# Patient Record
(demographics unavailable — no encounter records)

---

## 2024-10-16 NOTE — DISCUSSION/SUMMARY
[] : The components of the vaccine(s) to be administered today are listed in the plan of care. The disease(s) for which the vaccine(s) are intended to prevent and the risks have been discussed with the caretaker.  The risks are also included in the appropriate vaccination information statements which have been provided to the patient's caregiver.  The caregiver has given consent to vaccinate. [FreeTextEntry1] : WEIGHT PERCENTILES DISCUSSED WAS 50% NOW 27% (LAURA is not crawling yet) h/u 1 month for flu #2 and get weighed that day if suboptimal wt gain, would recommend seeing our nutritionist  Continue breastmilk or formula as desired. ADVANCE DIET AS TOLERATED. Increase table foods, 3 meals with 2-3 snacks per day. Incorporate up to 6 oz of flourinated water daily in a sippy cup. Discussed weaning of bottle and pacifier. Wipe teeth daily with washcloth. When in car, patient should be in rear-facing car seat in back seat. Put baby to sleep in own crib with no loose or soft bedding. Lower crib matress. Help baby to maintain consistent daily routines and sleep schedule. Recognize stranger anxiety. Ensure home is safe since baby is increasingly mobile. Be within arm's reach of baby at all times. Use consistent, positive discipline. Avoid screen time. Read aloud to baby. f/u for 12 month [facility]

## 2024-10-16 NOTE — PHYSICAL EXAM

## 2024-10-16 NOTE — HISTORY OF PRESENT ILLNESS
[FreeTextEntry7] : 9month old m here for a physical [FreeTextEntry1] : FEEDING: Tolerating feeds better Getting speech therapy 1x per week for h/o "choking" Now eating solids QD Continues to take Famotidine 0.4 ml BID per mom GI said likely wean at 1-1.5 yr of age Reflux also seems to exacerbate rhinitis EBM 20-24 oz per day SLEEP:  No issues.12 hours ELIMINATION: Frequent urination. Stools daily, soft. SAFETY: Rear facing car seat No exposure to cigarette smoking.  Having eye laser surgery - prob december   CONCERNS: constant screeching/ makes noise no consonants yet (note baby is 6 months corrected)

## 2024-11-20 NOTE — HISTORY OF PRESENT ILLNESS
[de-identified] : weight check  [FreeTextEntry6] : doing well hungrier po bid now wt % slightly improved saw pulm this am here for 2nd flu

## 2024-11-20 NOTE — REASON FOR VISIT
[Routine Follow-Up] : a routine follow-up visit for [BPD] : BPD [Medical Records] : medical records [Mother] : mother

## 2024-11-20 NOTE — PHYSICAL EXAM
[Well Nourished] : well nourished [Well Developed] : well developed [Well Groomed] : well groomed [Alert] : ~L alert [Active] : active [Normal Breathing Pattern] : normal breathing pattern [No Respiratory Distress] : no respiratory distress [No Drainage] : no drainage [No Conjunctivitis] : no conjunctivitis [No Nasal Drainage] : no nasal drainage [No Stridor] : no stridor [Absence Of Retractions] : absence of retractions [Symmetric] : symmetric [Good Expansion] : good expansion [No Acc Muscle Use] : no accessory muscle use [Equal Breath Sounds] : equal breath sounds bilaterally [Good aeration to bases] : good aeration to bases [No Crackles] : no crackles [No Rhonchi] : no rhonchi [No Wheezing] : no wheezing [Normal Sinus Rhythm] : normal sinus rhythm [No Heart Murmur] : no heart murmur [No Clubbing] : no clubbing [Capillary Refill < 2 secs] : capillary refill less than two seconds [No Cyanosis] : no cyanosis [No Contractures] : no contractures [Alert and  Oriented] : alert and oriented [FreeTextEntry3] : ext normal [FreeTextEntry5] : MMM [FreeTextEntry7] : no tachypnea, no retractions, no crackles [FreeTextEntry9] : +umbilical hernia [de-identified] : good tone [de-identified] : no visible rashes on exposed skin

## 2024-11-20 NOTE — REVIEW OF SYSTEMS
[Spitting Up] : spitting up [Immunizations are up to date] : Immunizations are up to date [RSV prophylaxis received] : RSV prophylaxis received [Poor Appetite] : no poor appetite [Snoring] : no snoring [Apnea] : no apnea [Heart Disease] : no heart disease [Wheezing] : no wheezing [Cough] : no cough [Bronchitis] : no bronchitis [Pneumonia] : no pneumonia [Problems Swallowing] : no problems swallowing [Reflux] : reflux [Vomiting] : no vomiting [Eczema] : no ezcema

## 2024-11-20 NOTE — SOCIAL HISTORY
[Mother] : mother [Father] : father [Cat] : cat [Smokers in Household] : there are no smokers in the home

## 2024-11-20 NOTE — CONSULT LETTER
[Dear  ___] : Dear  [unfilled], [Consult Letter:] : I had the pleasure of evaluating your patient, [unfilled]. [Please see my note below.] : Please see my note below. [Consult Closing:] : Thank you very much for allowing me to participate in the care of this patient.  If you have any questions, please do not hesitate to contact me. [Sincerely,] : Sincerely, [FreeTextEntry2] : Fany Dawn MD [FreeTextEntry3] : Carol Barfield MD Director, [facility]- Pediatric Pulmonology [facility] or [place] , Department of Pediatrics, [facility]

## 2024-11-20 NOTE — BIRTH HISTORY
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 CPAP, RA since 3/28

## 2024-11-20 NOTE — HISTORY OF PRESENT ILLNESS
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    Graduated from special bottles.    Off diuretics since summer.  Off oxygen since March.  No respiratory sx, no viral illnesses/colds.  Wears owlet at night for parent comfort, no desats, plans to stop after winter.     Slow weight gain.  Still with reflux.  Follows with GI.  On Pepcid.  Sometimes coughs when refluxing but not when swallowing or feeding.  Starting solids.    Possibly needs ROP surgery but may be outgrowing enough to not need, has f/u end of year.  Mom nervous about anesthesia/ETT.  [place] [facility]     Developing well, getting therapies, very happy with progress.   6/24 4mo f/u CLD.  Previously seen by NP Kaylynn, wants to transfer to [facility].    No respiratory sx, no snoring/apneas/cyanosis.  Wears owlet at night for parent comfort, 100%.    Remains on diuril 0.44ml once a day (3.7mg/kg/day), not being weight adjusted.    Follows with [facility]&S. 5/16 MBSS moderate oropharyngeal dysphagia, reduced endurance and coordination, inconsistent silent penetration with retrieval improved with special bottle.   No coughing or choking with feeds.    CURLY well controlled on famotidine.   +fhx allergies.  Dad with no true asthma mother clarifies today, he was given pump to try x 1.  4/15/24  Jer is a 3 month old ex 26 Weeker with BPD who presents for initial pulmonary evaluation. 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 D/c on chlorothiazide 250 mg/5 mL oral suspension--0.44 milliliter(s) by mouth once a day. Doing well since d/c, no respiratory concerns. No coughing or wheezing. No noisy breathing. On room air. Some spitting up following rotavirus vaccine. Feeding well, gaining well. Frequent bowel movements and wet diapers. Received the Beyfortus vaccine in hospital before discharge.   Modified Asthma Predictive Index: Major risk factors: 1. +parental hx of asthma- father with sports related asthma  2. No known allergic rhinitis or food allergies 3. No eczema

## 2024-11-20 NOTE — DISCUSSION/SUMMARY
[FreeTextEntry1] : f/u for [facility]cc  [] : The components of the vaccine(s) to be administered today are listed in the plan of care. The disease(s) for which the vaccine(s) are intended to prevent and the risks have been discussed with the caretaker.  The risks are also included in the appropriate vaccination information statements which have been provided to the patient's caregiver.  The caregiver has given consent to vaccinate.

## 2024-12-19 NOTE — REVIEW OF SYSTEMS
[Rhinorrhea] : rhinorrhea [Nasal Congestion] : nasal congestion [Cough] : cough [Negative] : Psychological  [Immunizations are up to date] : Immunizations are up to date [Synagis Injection] : synagis injection

## 2024-12-21 NOTE — PHYSICAL EXAM
[Roll Prone to Supine] : roll prone to supine [Roll Supine to Prone] : rolls supine to prone [Creep] : creeps [Come to Sit] : comes to sit [Unfisted] : unfisted [Transfer] : transfers objects [Unilateral Reach/Grasp] : unilaterally reaches/grasps  [Alert To Sounds] : alert to sounds [Soothes When Picked Up] : soothes when picked up  [Social Smile] : has a social smile [Orients To Voice] : orients to voice ["Yulia Ochoa"] : yulia yee [Razzing] : razzing [Babbling] : babbling [Righting Reflex] : normal righting reflex [Downward [place]] : normal downward parachute [Lateral Protective] : normal lateral protective [Normal] : sensation is intact to light touch [Laughs Aloud] : does not laugh aloud

## 2024-12-21 NOTE — BIRTH HISTORY
[At ___ Weeks Gestation] : at [unfilled] weeks gestation [de-identified] : Jer is an ex-26+6 weeker born to a , 30-year-old mother. Prenatal history remarkable for IGUR 1% with AEDV . received 2x courses of betamethasone. CPAP +5, 30% started at 1 minute of life and PPV given for 3-miomnutes with improvement.  Apgar 4/8 at 1 and 5 minutes, respectively. Transferred

## 2024-12-21 NOTE — PHYSICAL EXAM
[Roll Prone to Supine] : roll prone to supine [Roll Supine to Prone] : rolls supine to prone [Creep] : creeps [Come to Sit] : comes to sit [Unfisted] : unfisted [Transfer] : transfers objects [Unilateral Reach/Grasp] : unilaterally reaches/grasps  [Alert To Sounds] : alert to sounds [Soothes When Picked Up] : soothes when picked up  [Social Smile] : has a social smile [Orients To Voice] : orients to voice ["Yulia Ochoa"] : yulia yee [Razzing] : razzing [Babbling] : babbling [Righting Reflex] : normal righting reflex [Downward [place]] : normal downward parachute [Lateral Protective] : normal lateral protective [Normal] : sensation is intact to light touch [Laughs Aloud] : does not laugh aloud

## 2024-12-21 NOTE — HISTORY OF PRESENT ILLNESS
[Gestational Age: ___] : Gestational Age in Weeks: [unfilled] [Chronological Age: ___] : Chronological Age in Months: [unfilled] [Corrected Age: ___] : Corrected Age: [unfilled] [Gastroenterology: ___] : Gastroenterology: [unfilled] [Ophthalmology: ___] : Ophthalmology: [unfilled] [Pulmonology: ___] : Pulmonology: [unfilled] [None] : None [No Feeding Issues] : no feeding issues. [Early Intervention] : Early Intervention services [de-identified] : 11-month-old born at [facility] via  due to maternal history of chronic hypertension. Jer is an ex-26+6 weeker born to a , 30-year-old mother. Prenatal history remarkable for IGUR 1% with AEDV . received 2x courses of betamethasone. CPAP +5, 30% started at 1 minute of life and PPV given for 3-miomnutes with improvement.  Apgar 4/8 at 1 and 5 minutes, respectively. Transferred to NICU. Was in the NICU for almost 3-months. He is doing great.  Language: he is babbling but not saying mama/carol yet. A lot of screeching and laughing out loud.  Gross Motor: he can roll back and forth, not crawling but he gets on all four and creeping back. He can go from a lying position to sitting.  He is pulling to stand.   Fine Motor: reaches out to things, transfers from one hand to another, he likes to bang things together. Feeding/elimination: 20-24 oz of breast milk per day. He shows interest in what mom is eating; he will put it in his mouth and playing with food. He will eat pancakes. He may gag sometimes but he doesn't feel discouraged by it. No choking. Normal BM, no constipation.  Sleep: he is a very good sleeper. He sleeps 12-14 hours per night. He will nap also through the day. They were concerned about his weight because he dropped percentiles but he is doing well.   [de-identified] : none [de-identified] : none [de-identified] : yes, florencio famotidine 0.4 mg x 2 per day [de-identified] : none [de-identified] : none [de-identified] : none [de-identified] : none [de-identified] : recent with URI [de-identified] : none [de-identified] : none [de-identified] : none [de-identified] : he gets speech/feeding therapy 2x per week and PT: 2x per week, they just added a : to teach him how to play.  He has more interest in people than in toys.

## 2024-12-21 NOTE — BIRTH HISTORY
[At ___ Weeks Gestation] : at [unfilled] weeks gestation [de-identified] : Jer is an ex-26+6 weeker born to a , 30-year-old mother. Prenatal history remarkable for IGUR 1% with AEDV . received 2x courses of betamethasone. CPAP +5, 30% started at 1 minute of life and PPV given for 3-miomnutes with improvement.  Apgar 4/8 at 1 and 5 minutes, respectively. Transferred

## 2024-12-21 NOTE — HISTORY OF PRESENT ILLNESS
[Gestational Age: ___] : Gestational Age in Weeks: [unfilled] [Chronological Age: ___] : Chronological Age in Months: [unfilled] [Corrected Age: ___] : Corrected Age: [unfilled] [Gastroenterology: ___] : Gastroenterology: [unfilled] [Ophthalmology: ___] : Ophthalmology: [unfilled] [Pulmonology: ___] : Pulmonology: [unfilled] [None] : None [No Feeding Issues] : no feeding issues. [Early Intervention] : Early Intervention services [de-identified] : 11-month-old born at [facility] via  due to maternal history of chronic hypertension. Jer is an ex-26+6 weeker born to a , 30-year-old mother. Prenatal history remarkable for IGUR 1% with AEDV . received 2x courses of betamethasone. CPAP +5, 30% started at 1 minute of life and PPV given for 3-miomnutes with improvement.  Apgar 4/8 at 1 and 5 minutes, respectively. Transferred to NICU. Was in the NICU for almost 3-months. He is doing great.  Language: he is babbling but not saying mama/carol yet. A lot of screeching and laughing out loud.  Gross Motor: he can roll back and forth, not crawling but he gets on all four and creeping back. He can go from a lying position to sitting.  He is pulling to stand.   Fine Motor: reaches out to things, transfers from one hand to another, he likes to bang things together. Feeding/elimination: 20-24 oz of breast milk per day. He shows interest in what mom is eating; he will put it in his mouth and playing with food. He will eat pancakes. He may gag sometimes but he doesn't feel discouraged by it. No choking. Normal BM, no constipation.  Sleep: he is a very good sleeper. He sleeps 12-14 hours per night. He will nap also through the day. They were concerned about his weight because he dropped percentiles but he is doing well.   [de-identified] : none [de-identified] : none [de-identified] : yes, florencio famotidine 0.4 mg x 2 per day [de-identified] : none [de-identified] : none [de-identified] : none [de-identified] : none [de-identified] : recent with URI [de-identified] : none [de-identified] : none [de-identified] : none [de-identified] : he gets speech/feeding therapy 2x per week and PT: 2x per week, they just added a : to teach him how to play.  He has more interest in people than in toys.

## 2025-05-19 NOTE — HISTORY OF PRESENT ILLNESS
[de-identified] : weight check and vax [FreeTextEntry6] : Mom notes since last visit had COVID, coxsackie and stomach virus Eating well Eating all food groups Adding fats to his foods Feeling well today

## 2025-07-15 NOTE — DISCUSSION/SUMMARY
[Family Support] : family support [Child Development and Behavior] : child development and behavior [Language Promotion/Hearing] : language promotion/hearing [Toliet Training Readiness] : toliet training readiness [Safety] : safety [Mother] : mother [Father] : father [FreeTextEntry1] : - Discussed natural history and manifestations of coxsackie.  Discussed supportive care and maintaining adequate hydration.   - Follow up when well for inj, due for dtap

## 2025-07-15 NOTE — HISTORY OF PRESENT ILLNESS
[Parents] : parents [Cow's milk (Ounces per day ___)] : consumes [unfilled] oz of Cow's milk per day [Normal] : Normal [Sippy cup use] : Sippy cup use [Brushing teeth] : Brushing teeth [Vitamin] : Primary Fluoride Source: Vitamin [Playtime] : Playtime  [No] : No cigarette smoke exposure [FreeTextEntry7] : 18 M [facility].  Patient sick since 7/12, rash, decreased PO, coxsackie going around at .  Follows with ophthalmology and pulm.    [de-identified] : Good appetite, eats a variety of foods.

## 2025-07-15 NOTE — PHYSICAL EXAM
[Alert] : alert [No Acute Distress] : no acute distress [Normocephalic] : normocephalic [Anterior [place] Closed] : anterior fontanelle closed [Red Reflex Bilateral] : red reflex bilateral [PERRL] : PERRL [Normally Placed Ears] : normally placed ears [Auricles Well Formed] : auricles well formed [Clear Tympanic membranes with present light reflex and bony landmarks] : clear tympanic membranes with present light reflex and bony landmarks [No Discharge] : no discharge [Nares Patent] : nares patent [Palate Intact] : palate intact [Uvula Midline] : uvula midline [Tooth Eruption] : tooth eruption  [Supple, full passive range of motion] : supple, full passive range of motion [No Palpable Masses] : no palpable masses [Symmetric Chest Rise] : symmetric chest rise [Clear to Auscultation Bilaterally] : clear to auscultation bilaterally [Regular Rate and Rhythm] : regular rate and rhythm [S1, S2 present] : S1, S2 present [No Murmurs] : no murmurs [+2 Femoral Pulses] : +2 femoral pulses [Soft] : soft [NonTender] : non tender [Non Distended] : non distended [Normoactive Bowel Sounds] : normoactive bowel sounds [No Hepatomegaly] : no hepatomegaly [No Splenomegaly] : no splenomegaly [No Abnormal Lymph Nodes Palpated] : no abnormal lymph nodes palpated [No Clavicular Crepitus] : no clavicular crepitus [Symmetric Buttocks Creases] : symmetric buttocks creases [Cranial Nerves Grossly Intact] : cranial nerves grossly intact [de-identified] : blisters and erythematous macules palms, arms, legs, few on face